# Patient Record
Sex: MALE | Race: BLACK OR AFRICAN AMERICAN | NOT HISPANIC OR LATINO | Employment: OTHER | RURAL
[De-identification: names, ages, dates, MRNs, and addresses within clinical notes are randomized per-mention and may not be internally consistent; named-entity substitution may affect disease eponyms.]

---

## 2021-03-10 ENCOUNTER — HISTORICAL (OUTPATIENT)
Dept: ADMINISTRATIVE | Facility: HOSPITAL | Age: 76
End: 2021-03-10

## 2021-03-11 LAB
LAB AP CLINICAL INFORMATION: NORMAL
LAB AP DIAGNOSIS - HISTORICAL: NORMAL
LAB AP GROSS PATHOLOGY - HISTORICAL: NORMAL
LAB AP SPECIMEN SUBMITTED - HISTORICAL: NORMAL

## 2021-03-16 ENCOUNTER — OFFICE VISIT (OUTPATIENT)
Dept: UROLOGY | Facility: CLINIC | Age: 76
End: 2021-03-16
Payer: MEDICARE

## 2021-03-16 VITALS
DIASTOLIC BLOOD PRESSURE: 65 MMHG | HEIGHT: 76 IN | BODY MASS INDEX: 35.92 KG/M2 | TEMPERATURE: 99 F | HEART RATE: 78 BPM | WEIGHT: 295 LBS | SYSTOLIC BLOOD PRESSURE: 130 MMHG

## 2021-03-16 DIAGNOSIS — R50.82 FEVER POSTOP: Primary | ICD-10-CM

## 2021-03-16 PROCEDURE — 99024 PR POST-OP FOLLOW-UP VISIT: ICD-10-PCS | Mod: ,,, | Performed by: UROLOGY

## 2021-03-16 PROCEDURE — 99999 PR PBB SHADOW E&M-NEW PATIENT-LVL IV: ICD-10-PCS | Mod: PBBFAC,,, | Performed by: UROLOGY

## 2021-03-16 PROCEDURE — 99999 PR PBB SHADOW E&M-NEW PATIENT-LVL IV: CPT | Mod: PBBFAC,,, | Performed by: UROLOGY

## 2021-03-16 PROCEDURE — 99024 POSTOP FOLLOW-UP VISIT: CPT | Mod: ,,, | Performed by: UROLOGY

## 2021-03-16 PROCEDURE — 99204 OFFICE O/P NEW MOD 45 MIN: CPT | Mod: PBBFAC | Performed by: UROLOGY

## 2021-03-16 RX ORDER — SULFAMETHOXAZOLE AND TRIMETHOPRIM 800; 160 MG/1; MG/1
1 TABLET ORAL 2 TIMES DAILY
COMMUNITY
Start: 2021-03-14 | End: 2021-05-04

## 2021-03-16 RX ORDER — VALSARTAN AND HYDROCHLOROTHIAZIDE 320; 25 MG/1; MG/1
1 TABLET, FILM COATED ORAL DAILY
COMMUNITY
Start: 2021-02-24

## 2021-03-16 RX ORDER — CLOTRIMAZOLE 1 %
CREAM (GRAM) TOPICAL
COMMUNITY
Start: 2021-03-09

## 2021-03-16 RX ORDER — IBUPROFEN AND PSEUDOEPHEDRINE HYDROCHLORIDE 200; 30 MG/1; MG/1
1 TABLET, COATED ORAL EVERY 6 HOURS PRN
COMMUNITY
Start: 2020-12-18

## 2021-03-16 RX ORDER — TIZANIDINE 4 MG/1
5 TABLET ORAL EVERY 4 HOURS PRN
COMMUNITY
Start: 2020-12-14

## 2021-03-16 RX ORDER — ATORVASTATIN CALCIUM 40 MG/1
40 TABLET, FILM COATED ORAL DAILY
COMMUNITY
Start: 2021-02-24

## 2021-03-16 RX ORDER — MUPIROCIN 20 MG/G
OINTMENT TOPICAL
COMMUNITY
Start: 2021-03-08

## 2021-03-16 RX ORDER — ALBUTEROL SULFATE 0.83 MG/ML
SOLUTION RESPIRATORY (INHALATION)
COMMUNITY
Start: 2020-12-09

## 2021-03-16 RX ORDER — TAMSULOSIN HYDROCHLORIDE 0.4 MG/1
1 CAPSULE ORAL NIGHTLY
COMMUNITY
Start: 2021-03-01 | End: 2021-09-24

## 2021-03-16 RX ORDER — AMLODIPINE BESYLATE 10 MG/1
10 TABLET ORAL DAILY
COMMUNITY
Start: 2021-02-24

## 2021-03-18 DIAGNOSIS — A49.01 STAPHYLOCOCCUS AUREUS INFECTION: Primary | ICD-10-CM

## 2021-03-19 RX ORDER — SULFAMETHOXAZOLE AND TRIMETHOPRIM 800; 160 MG/1; MG/1
1 TABLET ORAL 2 TIMES DAILY
Qty: 20 TABLET | Refills: 0 | Status: SHIPPED | OUTPATIENT
Start: 2021-03-19 | End: 2021-05-04

## 2021-04-14 ENCOUNTER — OFFICE VISIT (OUTPATIENT)
Dept: UROLOGY | Facility: CLINIC | Age: 76
End: 2021-04-14
Payer: MEDICARE

## 2021-04-14 VITALS
DIASTOLIC BLOOD PRESSURE: 91 MMHG | BODY MASS INDEX: 36.04 KG/M2 | SYSTOLIC BLOOD PRESSURE: 163 MMHG | WEIGHT: 296 LBS | HEART RATE: 76 BPM | HEIGHT: 76 IN | TEMPERATURE: 98 F

## 2021-04-14 DIAGNOSIS — Z09 POSTOP CHECK: Primary | ICD-10-CM

## 2021-04-14 DIAGNOSIS — N39.0 URINARY TRACT INFECTION WITHOUT HEMATURIA, SITE UNSPECIFIED: ICD-10-CM

## 2021-04-14 PROCEDURE — 99999 PR PBB SHADOW E&M-EST. PATIENT-LVL III: ICD-10-PCS | Mod: PBBFAC,,, | Performed by: UROLOGY

## 2021-04-14 PROCEDURE — 99024 POSTOP FOLLOW-UP VISIT: CPT | Mod: ,,, | Performed by: UROLOGY

## 2021-04-14 PROCEDURE — 99024 PR POST-OP FOLLOW-UP VISIT: ICD-10-PCS | Mod: ,,, | Performed by: UROLOGY

## 2021-04-14 PROCEDURE — 99213 OFFICE O/P EST LOW 20 MIN: CPT | Mod: PBBFAC | Performed by: UROLOGY

## 2021-04-14 PROCEDURE — 99999 PR PBB SHADOW E&M-EST. PATIENT-LVL III: CPT | Mod: PBBFAC,,, | Performed by: UROLOGY

## 2021-04-14 RX ORDER — AMLODIPINE AND VALSARTAN 10; 320 MG/1; MG/1
1 TABLET ORAL DAILY
COMMUNITY

## 2021-04-16 ENCOUNTER — TELEPHONE (OUTPATIENT)
Dept: UROLOGY | Facility: CLINIC | Age: 76
End: 2021-04-16

## 2021-05-04 RX ORDER — METHENAMINE HIPPURATE 1000 MG/1
1 TABLET ORAL 2 TIMES DAILY
Qty: 60 TABLET | Refills: 11 | Status: SHIPPED | OUTPATIENT
Start: 2021-05-04 | End: 2022-01-25 | Stop reason: SDUPTHER

## 2021-05-05 ENCOUNTER — TELEPHONE (OUTPATIENT)
Dept: UROLOGY | Facility: CLINIC | Age: 76
End: 2021-05-05

## 2021-07-19 ENCOUNTER — OFFICE VISIT (OUTPATIENT)
Dept: UROLOGY | Facility: CLINIC | Age: 76
End: 2021-07-19
Payer: MEDICARE

## 2021-07-19 VITALS
HEART RATE: 82 BPM | DIASTOLIC BLOOD PRESSURE: 86 MMHG | HEIGHT: 76 IN | BODY MASS INDEX: 36.9 KG/M2 | WEIGHT: 303 LBS | SYSTOLIC BLOOD PRESSURE: 155 MMHG

## 2021-07-19 DIAGNOSIS — N41.1 CHRONIC PROSTATITIS: ICD-10-CM

## 2021-07-19 DIAGNOSIS — N39.0 URINARY TRACT INFECTION WITHOUT HEMATURIA, SITE UNSPECIFIED: Primary | ICD-10-CM

## 2021-07-19 DIAGNOSIS — N39.0 CHRONIC URINARY TRACT INFECTION: ICD-10-CM

## 2021-07-19 PROCEDURE — 99214 OFFICE O/P EST MOD 30 MIN: CPT | Mod: PBBFAC | Performed by: UROLOGY

## 2021-07-19 PROCEDURE — 99213 PR OFFICE/OUTPT VISIT, EST, LEVL III, 20-29 MIN: ICD-10-PCS | Mod: S$PBB,,, | Performed by: UROLOGY

## 2021-07-19 PROCEDURE — 87086 CULTURE, URINE: ICD-10-PCS | Mod: ,,, | Performed by: CLINICAL MEDICAL LABORATORY

## 2021-07-19 PROCEDURE — 99213 OFFICE O/P EST LOW 20 MIN: CPT | Mod: S$PBB,,, | Performed by: UROLOGY

## 2021-07-19 PROCEDURE — 87086 URINE CULTURE/COLONY COUNT: CPT | Mod: ,,, | Performed by: CLINICAL MEDICAL LABORATORY

## 2021-07-21 LAB — UA COMPLETE W REFLEX CULTURE PNL UR: NO GROWTH

## 2021-07-23 ENCOUNTER — TELEPHONE (OUTPATIENT)
Dept: UROLOGY | Facility: CLINIC | Age: 76
End: 2021-07-23

## 2022-01-25 ENCOUNTER — OFFICE VISIT (OUTPATIENT)
Dept: UROLOGY | Facility: CLINIC | Age: 77
End: 2022-01-25
Payer: MEDICARE

## 2022-01-25 VITALS
SYSTOLIC BLOOD PRESSURE: 162 MMHG | DIASTOLIC BLOOD PRESSURE: 86 MMHG | WEIGHT: 303 LBS | HEART RATE: 82 BPM | HEIGHT: 76 IN | BODY MASS INDEX: 36.9 KG/M2

## 2022-01-25 DIAGNOSIS — R31.9 URINARY TRACT INFECTION WITH HEMATURIA, SITE UNSPECIFIED: ICD-10-CM

## 2022-01-25 DIAGNOSIS — N39.0 URINARY TRACT INFECTION WITH HEMATURIA, SITE UNSPECIFIED: ICD-10-CM

## 2022-01-25 DIAGNOSIS — Z12.5 SCREENING FOR PROSTATE CANCER: ICD-10-CM

## 2022-01-25 DIAGNOSIS — N41.1 CHRONIC BACTERIAL PROSTATITIS: Primary | ICD-10-CM

## 2022-01-25 DIAGNOSIS — R97.20 ELEVATED PROSTATE SPECIFIC ANTIGEN (PSA): ICD-10-CM

## 2022-01-25 PROCEDURE — 99214 OFFICE O/P EST MOD 30 MIN: CPT | Mod: PBBFAC | Performed by: UROLOGY

## 2022-01-25 PROCEDURE — 99213 OFFICE O/P EST LOW 20 MIN: CPT | Mod: S$PBB,,, | Performed by: UROLOGY

## 2022-01-25 PROCEDURE — 87086 CULTURE, URINE: ICD-10-PCS | Mod: ,,, | Performed by: CLINICAL MEDICAL LABORATORY

## 2022-01-25 PROCEDURE — 99213 PR OFFICE/OUTPT VISIT, EST, LEVL III, 20-29 MIN: ICD-10-PCS | Mod: S$PBB,,, | Performed by: UROLOGY

## 2022-01-25 PROCEDURE — 87086 URINE CULTURE/COLONY COUNT: CPT | Mod: ,,, | Performed by: CLINICAL MEDICAL LABORATORY

## 2022-01-25 RX ORDER — BUDESONIDE AND FORMOTEROL FUMARATE DIHYDRATE 160; 4.5 UG/1; UG/1
2 AEROSOL RESPIRATORY (INHALATION) DAILY
COMMUNITY

## 2022-01-25 RX ORDER — METHENAMINE HIPPURATE 1000 MG/1
1 TABLET ORAL 2 TIMES DAILY
Qty: 180 TABLET | Refills: 3 | Status: SHIPPED | OUTPATIENT
Start: 2022-01-25 | End: 2023-01-20

## 2022-01-25 NOTE — PROGRESS NOTES
Subjective:       Patient ID: Joe Galvez is a 76 y.o. male.    Chief Complaint: Follow-up (6 month visit)    Chief Complaint: Follow-up (3 month visit)     Mr. Galvez is in for a postop check.  He had right hydrocelectomy done on March 10th.  He did have infection after surgery and had Staph aureus growing from his wound.  He took Bactrim DS for 10 days and is now asymptomatic.  Seems to be doing well and much less discomfort from the right hemiscrotum.  Size is decreasing.  Voiding okay with nocturia 1-2 times nightly.  He has no particular complaints today.  (April 14, 2021)     Mr. Galvez is in for follow-up because of previous right hydrocelectomy, previous epididymitis, and problem with chronic urinary tract infection.  No sensation of infection now.  Doing okay with his voiding.  He is still on methenamine hippurate for chronic UTI that is felt to be chronic bacterial prostatitis.  Overall feels he is back to normal.  Voiding is satisfactory.  (July 19, 2021)    Mr. Galvez is in for 6 month follow-up.  He has been on methenamine hippurate for 6 months now.  He has had a good 6 months without any new issues and no sensation of active infection.  No further problem from the previous hydrocele and had good results from surgery.  No new health issues. (January 25, 2022)    Review of Systems      Objective:      Physical Exam  Constitutional:       Appearance: Normal appearance.   Neurological:      Mental Status: He is alert.   Psychiatric:         Mood and Affect: Mood normal.         Behavior: Behavior normal.       urinalysis shows only occasional pus cells.  The dipstick had a trace of blood with pH of 5.0 and urine specific gravity 1.020 a  Assessment:       Problem List Items Addressed This Visit    None     Visit Diagnoses     Chronic bacterial prostatitis    -  Primary    Elevated prostate specific antigen (PSA)        Screening for prostate cancer        Relevant Orders    PSA, Screening    Urinary  tract infection with hematuria, site unspecified        Relevant Orders    Urine culture (Completed)          Plan:     Patient generally doing well but probably needs to stay on the methenamine hippurate for another several months.  I think he has chronic bacterial prostatitis.  Methenamine hippurate renewed.  Six month appointment with PSA or sooner if needed.  *

## 2022-01-27 LAB — UA COMPLETE W REFLEX CULTURE PNL UR: NO GROWTH

## 2022-01-27 NOTE — PATIENT INSTRUCTIONS
Patient generally doing well but probably needs to stay on the methenamine hippurate for another several months.  I think he has chronic bacterial prostatitis.  Methenamine hippurate renewed.  Urine sent for culture.  Six month appointment with PSA or sooner if needed.

## 2022-08-02 ENCOUNTER — OFFICE VISIT (OUTPATIENT)
Dept: UROLOGY | Facility: CLINIC | Age: 77
End: 2022-08-02
Payer: MEDICARE

## 2022-08-02 VITALS
WEIGHT: 295 LBS | BODY MASS INDEX: 35.92 KG/M2 | DIASTOLIC BLOOD PRESSURE: 85 MMHG | SYSTOLIC BLOOD PRESSURE: 158 MMHG | HEIGHT: 76 IN | HEART RATE: 79 BPM

## 2022-08-02 DIAGNOSIS — N13.8 BENIGN PROSTATIC HYPERPLASIA WITH URINARY OBSTRUCTION: ICD-10-CM

## 2022-08-02 DIAGNOSIS — N39.0 URINARY TRACT INFECTION WITH HEMATURIA, SITE UNSPECIFIED: ICD-10-CM

## 2022-08-02 DIAGNOSIS — N40.1 BENIGN PROSTATIC HYPERPLASIA WITH URINARY OBSTRUCTION: ICD-10-CM

## 2022-08-02 DIAGNOSIS — N41.1 CHRONIC PROSTATITIS: Primary | ICD-10-CM

## 2022-08-02 DIAGNOSIS — Z12.5 SCREENING FOR PROSTATE CANCER: ICD-10-CM

## 2022-08-02 DIAGNOSIS — R31.9 URINARY TRACT INFECTION WITH HEMATURIA, SITE UNSPECIFIED: ICD-10-CM

## 2022-08-02 DIAGNOSIS — N32.0 BLADDER OUTLET OBSTRUCTION: ICD-10-CM

## 2022-08-02 PROCEDURE — 99214 OFFICE O/P EST MOD 30 MIN: CPT | Mod: PBBFAC | Performed by: UROLOGY

## 2022-08-02 PROCEDURE — 87086 URINE CULTURE/COLONY COUNT: CPT | Mod: ,,, | Performed by: CLINICAL MEDICAL LABORATORY

## 2022-08-02 PROCEDURE — 87086 CULTURE, URINE: ICD-10-PCS | Mod: ,,, | Performed by: CLINICAL MEDICAL LABORATORY

## 2022-08-02 PROCEDURE — 99213 OFFICE O/P EST LOW 20 MIN: CPT | Mod: S$PBB,,, | Performed by: UROLOGY

## 2022-08-02 PROCEDURE — 99213 PR OFFICE/OUTPT VISIT, EST, LEVL III, 20-29 MIN: ICD-10-PCS | Mod: S$PBB,,, | Performed by: UROLOGY

## 2022-08-02 NOTE — PROGRESS NOTES
Subjective:       Patient ID: Joe Galvez is a 76 y.o. male.    Chief Complaint: Follow-up (6 month visit, PSA screening)    PSA was 1.18 on July 26, 2022     Mr. Galvez is in for a postop check.  He had right hydrocelectomy done on March 10th.  He did have infection after surgery and had Staph aureus growing from his wound.  He took Bactrim DS for 10 days and is now asymptomatic.  Seems to be doing well and much less discomfort from the right hemiscrotum.  Size is decreasing.  Voiding okay with nocturia 1-2 times nightly.  He has no particular complaints today.  (April 14, 2021)     Mr. Galvez is in for follow-up because of previous right hydrocelectomy, previous epididymitis, and problem with chronic urinary tract infection.  No sensation of infection now.  Doing okay with his voiding.  He is still on methenamine hippurate for chronic UTI that is felt to be chronic bacterial prostatitis.  Overall feels he is back to normal.  Voiding is satisfactory.  (July 19, 2021)     Mr. Galvez is in for 6 month follow-up.  He has been on methenamine hippurate for 6 months now.  He has had a good 6 months without any new issues and no sensation of active infection.  No further problem from the previous hydrocele and had good results from surgery.  No new health issues. (January 25, 2022)     Mr. Galvez is in for his 6 month follow-up.  He has now been on methenamine hippurate for slightly over 1 year and has had no breakthrough infections.  He is feeling well and seems to be doing well currently.  Voiding is satisfactory and has nocturia x2 on average.  Nothing makes him think he is having any flare-ups with prostatitis or other urinary problems.  We may want to see if we can get him off of the methenamine hippurate without any breakthrough infections.  He has had a good 6 months in general. (August 2, 2022)    Review of Systems      Objective:      Physical Exam  Constitutional:       Appearance: Normal appearance. He is  normal weight.   Neurological:      Mental Status: He is alert.   Psychiatric:         Mood and Affect: Mood normal.         Behavior: Behavior normal.       urinalysis revealed 0-2 pus cells with occasional epithelial cells.  Dipstick had a trace of blood with pH of 6.0 and specific gravity 1.020  Assessment:       Problem List Items Addressed This Visit    None     Visit Diagnoses     Chronic prostatitis    -  Primary    Urinary tract infection with hematuria, site unspecified        Relevant Orders    Urine culture    Benign prostatic hyperplasia with urinary obstruction        Bladder outlet obstruction        Screening for prostate cancer              Plan:     Patient doing well clinically.  He had PSA at Dr. Rm's office last week and it was normal at 1.18.  Urine sent for culture.  If culture negative we will stop methenamine hippurate when current bottle runs out and see how he does..  If he had no breakthrough infections we will see in 6 months.*

## 2022-08-02 NOTE — PATIENT INSTRUCTIONS
Patient doing well clinically.  He had PSA at Dr. Rm's office last week and it was normal at 1.18.  Urine sent for culture.  If culture negative we will stop methenamine hippurate when current bottle runs out and see how he does..  If he had no breakthrough infections we will see in 6 months.

## 2022-08-04 LAB — UA COMPLETE W REFLEX CULTURE PNL UR: NO GROWTH

## 2023-09-25 ENCOUNTER — TELEPHONE (OUTPATIENT)
Dept: UROLOGY | Facility: CLINIC | Age: 78
End: 2023-09-25
Payer: COMMERCIAL

## 2023-09-25 NOTE — TELEPHONE ENCOUNTER
I received this message: he wants a refill on Tamsulosin call back number is [phone].    We received a request from pharmacy and Dr Leos signed request and order was faxed to [facility] in [place], [place].

## 2024-03-28 ENCOUNTER — CLINICAL SUPPORT (OUTPATIENT)
Dept: UROLOGY | Facility: CLINIC | Age: 79
End: 2024-03-28
Payer: MEDICARE

## 2024-03-28 VITALS
SYSTOLIC BLOOD PRESSURE: 161 MMHG | WEIGHT: 306 LBS | HEIGHT: 76 IN | DIASTOLIC BLOOD PRESSURE: 84 MMHG | HEART RATE: 80 BPM | TEMPERATURE: 98 F | BODY MASS INDEX: 37.26 KG/M2

## 2024-03-28 DIAGNOSIS — N39.0 URINARY TRACT INFECTION WITH HEMATURIA, SITE UNSPECIFIED: Primary | ICD-10-CM

## 2024-03-28 DIAGNOSIS — R31.9 URINARY TRACT INFECTION WITH HEMATURIA, SITE UNSPECIFIED: Primary | ICD-10-CM

## 2024-03-28 DIAGNOSIS — N39.0 URINARY TRACT INFECTION WITHOUT HEMATURIA, SITE UNSPECIFIED: ICD-10-CM

## 2024-03-28 PROCEDURE — 87077 CULTURE AEROBIC IDENTIFY: CPT | Mod: ,,, | Performed by: CLINICAL MEDICAL LABORATORY

## 2024-03-28 PROCEDURE — 99213 OFFICE O/P EST LOW 20 MIN: CPT | Mod: PBBFAC

## 2024-03-28 PROCEDURE — 87086 URINE CULTURE/COLONY COUNT: CPT | Mod: ,,, | Performed by: CLINICAL MEDICAL LABORATORY

## 2024-03-28 PROCEDURE — 87186 SC STD MICRODIL/AGAR DIL: CPT | Mod: ,,, | Performed by: CLINICAL MEDICAL LABORATORY

## 2024-03-28 NOTE — PROGRESS NOTES
Patient came by the clinic and left urine specimen for urine culture. Patient reports increased urinary frequency as only symptom. Pt is aware that it may be Monday before we get results, but we will call him with results ASAP.

## 2024-03-30 LAB
UA COMPLETE W REFLEX CULTURE PNL UR: ABNORMAL
UA COMPLETE W REFLEX CULTURE PNL UR: ABNORMAL

## 2024-04-01 DIAGNOSIS — N39.0 URINARY TRACT INFECTION WITHOUT HEMATURIA, SITE UNSPECIFIED: Primary | ICD-10-CM

## 2024-04-01 RX ORDER — CIPROFLOXACIN 500 MG/1
500 TABLET ORAL EVERY 12 HOURS
Qty: 60 TABLET | Refills: 0 | Status: SHIPPED | OUTPATIENT
Start: 2024-04-01 | End: 2024-05-01

## 2024-04-01 NOTE — TELEPHONE ENCOUNTER
Mr. Galvez returned my phone call.  I notified him of his positive urine culture. Urine grew 2 organisms, both grew >100,000 gram negative bacilli. Isolate # 1 grew: Klebsiella Pneumoniae and Isolate # 2 grew: Morganella Morganii, both sensitive to Cipro.    Dr. Leos has prescribed Cipro (Ciprofloxacin) 500 mg, # 60 with no refill. Mr. Galvez will take 1 tablet by mouth twice daily for 30 days.    This prescription will be e-scripted to [id number] in [place], [place] at patient's request.

## 2024-05-14 ENCOUNTER — OFFICE VISIT (OUTPATIENT)
Dept: UROLOGY | Facility: CLINIC | Age: 79
End: 2024-05-14
Payer: MEDICARE

## 2024-05-14 VITALS
DIASTOLIC BLOOD PRESSURE: 92 MMHG | HEIGHT: 78 IN | HEART RATE: 82 BPM | WEIGHT: 301 LBS | BODY MASS INDEX: 34.83 KG/M2 | SYSTOLIC BLOOD PRESSURE: 173 MMHG

## 2024-05-14 DIAGNOSIS — N40.1 BENIGN PROSTATIC HYPERPLASIA WITH URINARY OBSTRUCTION: ICD-10-CM

## 2024-05-14 DIAGNOSIS — N13.8 BENIGN PROSTATIC HYPERPLASIA WITH URINARY OBSTRUCTION: ICD-10-CM

## 2024-05-14 DIAGNOSIS — N39.0 URINARY TRACT INFECTION WITHOUT HEMATURIA, SITE UNSPECIFIED: ICD-10-CM

## 2024-05-14 DIAGNOSIS — N41.1 CHRONIC PROSTATITIS: ICD-10-CM

## 2024-05-14 DIAGNOSIS — N32.0 BLADDER OUTLET OBSTRUCTION: ICD-10-CM

## 2024-05-14 DIAGNOSIS — N41.1 CHRONIC BACTERIAL PROSTATITIS: Primary | ICD-10-CM

## 2024-05-14 PROCEDURE — 87086 URINE CULTURE/COLONY COUNT: CPT | Mod: ,,, | Performed by: CLINICAL MEDICAL LABORATORY

## 2024-05-14 PROCEDURE — 99214 OFFICE O/P EST MOD 30 MIN: CPT | Mod: PBBFAC | Performed by: UROLOGY

## 2024-05-14 PROCEDURE — 99214 OFFICE O/P EST MOD 30 MIN: CPT | Mod: S$PBB,,, | Performed by: UROLOGY

## 2024-05-14 PROCEDURE — 87186 SC STD MICRODIL/AGAR DIL: CPT | Mod: ,,, | Performed by: CLINICAL MEDICAL LABORATORY

## 2024-05-14 PROCEDURE — 87077 CULTURE AEROBIC IDENTIFY: CPT | Mod: ,,, | Performed by: CLINICAL MEDICAL LABORATORY

## 2024-05-14 RX ORDER — METHENAMINE HIPPURATE 1000 MG/1
1 TABLET ORAL 2 TIMES DAILY
Qty: 180 TABLET | Refills: 3 | Status: SHIPPED | OUTPATIENT
Start: 2024-05-14

## 2024-05-14 RX ORDER — ASPIRIN 81 MG/1
81 TABLET ORAL DAILY
COMMUNITY

## 2024-05-14 NOTE — PROGRESS NOTES
Subjective     Patient ID: Joe Galvez is a 78 y.o. male.    Chief Complaint: Follow-up    PSA was 1.18 on July 26, 2022     Mr. Galvez is in for a postop check.  He had right hydrocelectomy done on March 10th.  He did have infection after surgery and had Staph aureus growing from his wound.  He took Bactrim DS for 10 days and is now asymptomatic.  Seems to be doing well and much less discomfort from the right hemiscrotum.  Size is decreasing.  Voiding okay with nocturia 1-2 times nightly.  He has no particular complaints today.  (April 14, 2021)     Mr. Galvez is in for follow-up because of previous right hydrocelectomy, previous epididymitis, and problem with chronic urinary tract infection.  No sensation of infection now.  Doing okay with his voiding.  He is still on methenamine hippurate for chronic UTI that is felt to be chronic bacterial prostatitis.  Overall feels he is back to normal.  Voiding is satisfactory.  (July 19, 2021)     Mr. Galvez is in for 6 month follow-up.  He has been on methenamine hippurate for 6 months now.  He has had a good 6 months without any new issues and no sensation of active infection.  No further problem from the previous hydrocele and had good results from surgery.  No new health issues. (January 25, 2022)     Mr. Galvez is in for his 6 month follow-up.  He has now been on methenamine hippurate for slightly over 1 year and has had no breakthrough infections.  He is feeling well and seems to be doing well currently.  Voiding is satisfactory and has nocturia x2 on average.  Nothing makes him think he is having any flare-ups with prostatitis or other urinary problems.  We may want to see if we can get him off of the methenamine hippurate without any breakthrough infections.  He has had a good 6 months in general. (August 2, 2022)     Objective:  Physical Exam  Constitutional:       Appearance: Normal appearance. He is normal weight.   Neurological:      Mental Status: He is alert.    Psychiatric:         Mood and Affect: Mood normal.         Behavior: Behavior normal.        urinalysis revealed 0-2 pus cells with occasional epithelial cells.  Dipstick had a trace of blood with pH of 6.0 and specific gravity 1.020    Assessment:        Problem List Items Addressed This Visit   None       Visit Diagnoses     Chronic prostatitis    -  Primary    Urinary tract infection with hematuria, site unspecified        Relevant Orders    Urine culture    Benign prostatic hyperplasia with urinary obstruction        Bladder outlet obstruction        Screening for prostate cancer      Plan:    Patient doing well clinically.  He had PSA at Dr. Rm's office last week and it was normal at 1.18.  Urine sent for culture.  If culture negative we will stop methenamine hippurate when current bottle runs out and see how he does..  If he had no breakthrough infections we will see in 6 months.*        Electronically signed by Mahin Leos Jr., MD at 8/2/2022  5:52 PM    Instructions    Follow up in 6 months (on 2/13/2023) for 6 mth appt; No labs, No X-Rays on 02/13/2023 at 10:30AM.  Patient doing well clinically.  He had PSA at Dr. Rm's office last week and it was normal at 1.18.  Urine sent for culture.  If culture negative we will stop methenamine hippurate when current bottle runs out and see how he does..  If he had no breakthrough infections we will see in 6 months.  -------------------------------------------------------------------------------------------------------------------------------------------------------------------------------------------------------------------------------------------------  Mr. Galvez has not been seen by me since above visit.  He did bring in a urine culture in late March and we started him on a 1 month course of Cipro 500 mg b.i.d. on April 1st.  He has completed that round of medicine.  Urine culture grew over 100,000 Klebsiella pneumoniae and over 100,000 Morganella  morganii at that time.  Patient feeling better and doing okay at present.  In for follow-up.  We have felt for the last few years that patient has chronic bacterial prostatitis.  We did treat him with methenamine hippurate and I think we kept him on that for a year before we let him stop it and see how he did.    It is apparent that he will not be able get off of that without having flare-ups of infection.  Although he is feeling well now probably needs to be placed back on the medication and kept on it indefinitely.  Patient in with his wife for follow-up.  Feeling reasonably well at present.  [May 14, 2024]             Review of Systems       Objective     Physical Exam  Constitutional:       Appearance: Normal appearance. He is normal weight.   Neurological:      Mental Status: He is alert.   Psychiatric:         Mood and Affect: Mood normal.         Behavior: Behavior normal.     Urinalysis looks okay with only occasional pus cells.  Dipstick was negative with pH of 6.0 and specific gravity 1.015     Assessment and Plan     1. Chronic bacterial prostatitis  -     methenamine (HIPREX) 1 gram Tab; Take 1 tablet (1 g total) by mouth 2 (two) times daily.  Dispense: 180 tablet; Refill: 3    2. Urinary tract infection without hematuria, site unspecified  -     Urine culture; Future; Expected date: 05/14/2024    3. Chronic prostatitis    4. Bladder outlet obstruction    5. Benign prostatic hyperplasia with urinary obstruction      Urine sent for culture just so we know where we stand at present.  Urinalysis looks okay.  Patient placed back on methenamine hippurate and recommend he stay on it indefinitely.  Mr. Galvez understands that I plan to retire this year.  Appointment with Augustus Muniz for follow-up in 1 year.  Probably needs to stay on methenamine hippurate indefinitely.  If he has any breakthrough infections would treat specifically.          No follow-ups on file.

## 2024-05-14 NOTE — PATIENT INSTRUCTIONS
Urine sent for culture just so we know where we stand at present.  Urinalysis looks okay.  Patient placed back on methenamine hippurate and recommend he stay on it indefinitely.  Mr. Galvez understands that I plan to retire this year.  Appointment with Mr. Muniz for follow-up in 1 year.  Probably needs to stay on methenamine hippurate indefinitely.  If he has any breakthrough infections would treat specifically.

## 2024-05-16 ENCOUNTER — TELEPHONE (OUTPATIENT)
Dept: UROLOGY | Facility: CLINIC | Age: 79
End: 2024-05-16
Payer: MEDICARE

## 2024-05-16 LAB — UA COMPLETE W REFLEX CULTURE PNL UR: ABNORMAL

## 2024-05-16 NOTE — TELEPHONE ENCOUNTER
Reported urine culture results to patient and he denies any s/s of UTI. Dr Leos wants patient to continue methenamine hippurate 1 G BID and call if pt were to develop symptoms of UTI. Pt voiced understanding and said he would do that.

## 2024-11-07 DIAGNOSIS — N40.1 BPH WITH OBSTRUCTION/LOWER URINARY TRACT SYMPTOMS: Primary | ICD-10-CM

## 2024-11-07 DIAGNOSIS — N13.8 BPH WITH OBSTRUCTION/LOWER URINARY TRACT SYMPTOMS: Primary | ICD-10-CM

## 2024-11-07 RX ORDER — TAMSULOSIN HYDROCHLORIDE 0.4 MG/1
1 CAPSULE ORAL NIGHTLY
Qty: 90 CAPSULE | Refills: 3 | Status: SHIPPED | OUTPATIENT
Start: 2024-11-07

## 2024-11-07 NOTE — TELEPHONE ENCOUNTER
----- Message from Maryam sent at 11/7/2024  1:08 PM CST -----  Regarding: Refill  Who Called: Joe Galvez    Refill or New Rx:Refill  RX Name and Strength:tamsulosin (FLOMAX) 0.4 mg Cap  How is the patient currently taking it? (ex. 1XDay):1  Is this a 30 day or 90 day RX:90  Local or Mail Order:  List of preferred pharmacies on file (remove unneeded): [unfilled][facility] [address] [place] [address], [place]  [phone]     Received notification patient needs refills on Tamsulosin nightly sent to [facility] in [place].  Patient last seen on 5/14/24, next scheduled appointment is on 5/15/24 with MARY Gillette.